# Patient Record
Sex: MALE | Race: WHITE | NOT HISPANIC OR LATINO | Employment: UNEMPLOYED | URBAN - METROPOLITAN AREA
[De-identification: names, ages, dates, MRNs, and addresses within clinical notes are randomized per-mention and may not be internally consistent; named-entity substitution may affect disease eponyms.]

---

## 2020-01-24 ENCOUNTER — OFFICE VISIT (OUTPATIENT)
Dept: URGENT CARE | Facility: CLINIC | Age: 35
End: 2020-01-24
Payer: MEDICAID

## 2020-01-24 VITALS
DIASTOLIC BLOOD PRESSURE: 90 MMHG | RESPIRATION RATE: 18 BRPM | BODY MASS INDEX: 19.85 KG/M2 | OXYGEN SATURATION: 97 % | WEIGHT: 134 LBS | TEMPERATURE: 98.9 F | HEART RATE: 93 BPM | SYSTOLIC BLOOD PRESSURE: 132 MMHG | HEIGHT: 69 IN

## 2020-01-24 DIAGNOSIS — F41.0 PANIC ATTACKS: ICD-10-CM

## 2020-01-24 DIAGNOSIS — F12.90 MARIJUANA SMOKER: ICD-10-CM

## 2020-01-24 DIAGNOSIS — Z78.9 DAILY CONSUMPTION OF ALCOHOL: ICD-10-CM

## 2020-01-24 DIAGNOSIS — R03.0 ELEVATED BLOOD PRESSURE READING IN OFFICE WITHOUT DIAGNOSIS OF HYPERTENSION: ICD-10-CM

## 2020-01-24 DIAGNOSIS — Z72.89 NON-NICOTINE VAPOR PRODUCT USER: ICD-10-CM

## 2020-01-24 DIAGNOSIS — F98.8 ATTENTION DEFICIT DISORDER, UNSPECIFIED HYPERACTIVITY PRESENCE: ICD-10-CM

## 2020-01-24 PROCEDURE — 99203 OFFICE O/P NEW LOW 30 MIN: CPT | Performed by: PHYSICIAN ASSISTANT

## 2020-01-24 RX ORDER — HYDROXYZINE HYDROCHLORIDE 25 MG/1
25 TABLET, FILM COATED ORAL 3 TIMES DAILY PRN
Qty: 30 TAB | Refills: 0 | Status: SHIPPED | OUTPATIENT
Start: 2020-01-24

## 2020-01-24 RX ORDER — DEXTROAMPHETAMINE/AMPHETAMINE 10 MG
CAPSULE, EXT RELEASE 24 HR ORAL
COMMUNITY
Start: 2020-01-23

## 2020-01-24 ASSESSMENT — LIFESTYLE VARIABLES: SUBSTANCE_ABUSE: 1

## 2020-01-24 ASSESSMENT — ENCOUNTER SYMPTOMS
HALLUCINATIONS: 0
DEPRESSION: 1
INSOMNIA: 1
DIAPHORESIS: 1
PALPITATIONS: 1
NERVOUS/ANXIOUS: 1

## 2020-01-24 NOTE — PROGRESS NOTES
Subjective:     Yogi Schaefer  is a 34 y.o. male who presents for Panic Attack (x2 days, heart racing, sweating, sob, sensation of fear, saw [facility] and was prescribed adderall and was told he ahs hypertension )    This is a new problem.  Patient presents to urgent care with concern for panic attacks and elevated blood pressure.  Patient reports lifelong history of issues with ADD.  He has avoided seeking treatment for many years.  However, recently he did establish with a psychiatrist who stated that he had (the worst case he has ever seen).  Patient was started on Adderall and has taken 2 doses.  Patient reports that he was living in [place] and moved to [place] to care for his ill mother.  She was doing much better and he moved to [place] approximately 1 month ago.  1 week after his move he began to experience severe panic attacks which are worse at night.  Patient reports during a panic attack he will have a sensation of impending doom, feeling very jittery and shaky, racing heart feeling very nauseated and very anxious.  These will last anywhere from 5 minutes up to 2 hours.  The patient states that he did discuss this with the psychiatrist who told him that this is likely a result of untreated ADD and that he would likely improve with the Adderall.    The patient also reports that while at the psychiatrist office he was noted to have elevated blood pressure and was told that he had hypertension and needed to get seen by primary care in order to get started on medication.  Patient has no prior history of elevated blood pressure.  There is no family history of hypertension in the family.    Patient does admit to daily marijuana use sometimes in the form of vapor product.  Patient also admits to daily alcohol consumption.  He denies any additional controlled substance use.     Panic Attack   Associated symptoms include chest pain and diaphoresis.         Review of Systems   Constitutional:  "Positive for diaphoresis.   Cardiovascular: Positive for chest pain and palpitations.   Psychiatric/Behavioral: Positive for depression and substance abuse. Negative for hallucinations and suicidal ideas. The patient is nervous/anxious and has insomnia.    All other systems reviewed and are negative.    Allergies   Allergen Reactions   • Penicillins      Past medical history, family history, surgical history and social history are reviewed and updated in the record today.     Objective:   /90 (Patient Position: Sitting)   Pulse 93   Temp 37.2 °C (98.9 °F) (Temporal)   Resp 18   Ht 1.753 m (5' 9\")   Wt 60.8 kg (134 lb)   SpO2 97%   BMI 19.79 kg/m²   Physical Exam  Vitals signs reviewed.   Constitutional:       Appearance: He is well-developed. He is not ill-appearing or toxic-appearing.   HENT:      Head: Normocephalic and atraumatic.      Right Ear: Tympanic membrane, ear canal and external ear normal.      Left Ear: Tympanic membrane, ear canal and external ear normal.      Nose: Nose normal.      Mouth/Throat:      Lips: Pink.      Mouth: Mucous membranes are moist.      Pharynx: Uvula midline. No oropharyngeal exudate.   Eyes:      Conjunctiva/sclera: Conjunctivae normal.      Pupils: Pupils are equal, round, and reactive to light.   Neck:      Musculoskeletal: Normal range of motion and neck supple.      Thyroid: No thyroid mass, thyromegaly or thyroid tenderness.      Vascular: No carotid bruit.   Cardiovascular:      Rate and Rhythm: Normal rate and regular rhythm.      Heart sounds: Normal heart sounds. No murmur. No friction rub.   Pulmonary:      Effort: Pulmonary effort is normal. No respiratory distress.      Breath sounds: Normal breath sounds.   Abdominal:      General: Bowel sounds are normal.      Palpations: Abdomen is soft.      Tenderness: There is no tenderness.   Musculoskeletal: Normal range of motion.   Lymphadenopathy:      Head:      Right side of head: No submental, " submandibular or tonsillar adenopathy.      Left side of head: No submental, submandibular or tonsillar adenopathy.      Cervical: No cervical adenopathy.      Upper Body:      Right upper body: No supraclavicular adenopathy.      Left upper body: No supraclavicular adenopathy.   Skin:     General: Skin is warm and dry.      Findings: No rash.   Neurological:      Mental Status: He is alert and oriented to person, place, and time.      Cranial Nerves: Cranial nerves are intact. No cranial nerve deficit.      Sensory: Sensation is intact. No sensory deficit.      Motor: Motor function is intact.      Coordination: Coordination is intact. Coordination normal.      Gait: Gait is intact.   Psychiatric:         Attention and Perception: Attention normal.         Mood and Affect: Mood is anxious.         Speech: Speech is rapid and pressured.         Behavior: Behavior normal.         Thought Content: Thought content normal.         Cognition and Memory: Cognition normal.         Judgment: Judgment normal.          Assessment/Plan:   1. Panic attacks  - CBC WITH DIFFERENTIAL; Future  - TSH+FREE T4  - REFERRAL TO BEHAVIORAL HEALTH  - REFERRAL TO FOLLOW-UP WITH PRIMARY CARE  - hydrOXYzine HCl (ATARAX) 25 MG Tab; Take 1 Tab by mouth 3 times a day as needed for Itching.  Dispense: 30 Tab; Refill: 0  - Basic Metabolic Panel; Future    2. Elevated blood pressure reading in office without diagnosis of hypertension  - CBC WITH DIFFERENTIAL; Future  - TSH+FREE T4  - REFERRAL TO FOLLOW-UP WITH PRIMARY CARE  - Basic Metabolic Panel; Future    3. Non-nicotine vapor product user    4. Daily consumption of alcohol    5. Marijuana smoker    6. Attention deficit disorder, unspecified hyperactivity presence    Check labs as above.  Patient is given hydroxyzine as needed for panic attacks.  Referral placed to follow-up with behavioral health and to establish care with primary care.  Counseled patient on potential for marijuana worsening  symptoms, encouraged cessation.  Also recommend decrease alcohol consumption.      Differential diagnosis, natural history, supportive care, and indications for immediate follow-up discussed.  Red flag warning symptoms and strict ER/follow-up precautions given.  The patient demonstrated a good understanding and agreed with the treatment plan.  Please note that this note was created using voice recognition speech to text software. Every effort has been made to correct obvious errors.  However, I expect there are errors of grammar and possibly context that were not discovered prior to finalizing the note  JARON Mosqueda PA-C    Addendum: Correction to physical exam: 1/26/20  Left ear deformity postsurgical changes  JARON Mosqueda PA-C